# Patient Record
Sex: FEMALE | Employment: FULL TIME | URBAN - METROPOLITAN AREA
[De-identification: names, ages, dates, MRNs, and addresses within clinical notes are randomized per-mention and may not be internally consistent; named-entity substitution may affect disease eponyms.]

---

## 2020-09-11 ENCOUNTER — APPOINTMENT (OUTPATIENT)
Dept: PHYSICAL THERAPY | Age: 55
End: 2020-09-11

## 2024-07-23 ENCOUNTER — OFFICE VISIT (OUTPATIENT)
Facility: CLINIC | Age: 59
End: 2024-07-23
Payer: MEDICARE

## 2024-07-23 ENCOUNTER — HOSPITAL ENCOUNTER (OUTPATIENT)
Facility: HOSPITAL | Age: 59
Setting detail: SPECIMEN
Discharge: HOME | End: 2024-07-26
Payer: MEDICARE

## 2024-07-23 VITALS
SYSTOLIC BLOOD PRESSURE: 121 MMHG | BODY MASS INDEX: 32.2 KG/M2 | OXYGEN SATURATION: 96 % | DIASTOLIC BLOOD PRESSURE: 69 MMHG | HEART RATE: 90 BPM | HEIGHT: 62 IN | RESPIRATION RATE: 17 BRPM | TEMPERATURE: 97.8 F | WEIGHT: 175 LBS

## 2024-07-23 DIAGNOSIS — E66.09 CLASS 1 OBESITY DUE TO EXCESS CALORIES WITH SERIOUS COMORBIDITY AND BODY MASS INDEX (BMI) OF 32.0 TO 32.9 IN ADULT: ICD-10-CM

## 2024-07-23 DIAGNOSIS — I10 ESSENTIAL HYPERTENSION: ICD-10-CM

## 2024-07-23 DIAGNOSIS — F41.9 ANXIETY: ICD-10-CM

## 2024-07-23 DIAGNOSIS — Z76.89 ENCOUNTER TO ESTABLISH CARE: Primary | ICD-10-CM

## 2024-07-23 DIAGNOSIS — H54.8 LEGALLY BLIND: ICD-10-CM

## 2024-07-23 DIAGNOSIS — R76.8 POSITIVE ANA (ANTINUCLEAR ANTIBODY): ICD-10-CM

## 2024-07-23 DIAGNOSIS — A50.31: ICD-10-CM

## 2024-07-23 DIAGNOSIS — M79.7 FIBROMYALGIA: ICD-10-CM

## 2024-07-23 DIAGNOSIS — E78.2 MIXED HYPERLIPIDEMIA: ICD-10-CM

## 2024-07-23 PROBLEM — E66.811 CLASS 1 OBESITY DUE TO EXCESS CALORIES WITH SERIOUS COMORBIDITY AND BODY MASS INDEX (BMI) OF 32.0 TO 32.9 IN ADULT: Status: ACTIVE | Noted: 2024-07-23

## 2024-07-23 LAB
ALBUMIN SERPL-MCNC: 4.5 G/DL (ref 3.4–5)
ALBUMIN/GLOB SERPL: 1.7 (ref 0.8–1.7)
ALP SERPL-CCNC: 67 U/L (ref 45–117)
ALT SERPL-CCNC: 28 U/L (ref 13–56)
ANION GAP SERPL CALC-SCNC: 7 MMOL/L (ref 3–18)
AST SERPL-CCNC: 21 U/L (ref 10–38)
BILIRUB SERPL-MCNC: 0.5 MG/DL (ref 0.2–1)
BUN SERPL-MCNC: 13 MG/DL (ref 7–18)
BUN/CREAT SERPL: 17 (ref 12–20)
CALCIUM SERPL-MCNC: 10.2 MG/DL (ref 8.5–10.1)
CHLORIDE SERPL-SCNC: 97 MMOL/L (ref 100–111)
CO2 SERPL-SCNC: 33 MMOL/L (ref 21–32)
CREAT SERPL-MCNC: 0.75 MG/DL (ref 0.6–1.3)
GLOBULIN SER CALC-MCNC: 2.7 G/DL (ref 2–4)
GLUCOSE SERPL-MCNC: 95 MG/DL (ref 74–99)
POTASSIUM SERPL-SCNC: 4 MMOL/L (ref 3.5–5.5)
PROT SERPL-MCNC: 7.2 G/DL (ref 6.4–8.2)
SODIUM SERPL-SCNC: 137 MMOL/L (ref 136–145)
TSH SERPL DL<=0.05 MIU/L-ACNC: 0.96 UIU/ML (ref 0.36–3.74)

## 2024-07-23 PROCEDURE — 3078F DIAST BP <80 MM HG: CPT | Performed by: FAMILY MEDICINE

## 2024-07-23 PROCEDURE — 3074F SYST BP LT 130 MM HG: CPT | Performed by: FAMILY MEDICINE

## 2024-07-23 PROCEDURE — 99204 OFFICE O/P NEW MOD 45 MIN: CPT | Performed by: FAMILY MEDICINE

## 2024-07-23 PROCEDURE — 80053 COMPREHEN METABOLIC PANEL: CPT

## 2024-07-23 PROCEDURE — 84443 ASSAY THYROID STIM HORMONE: CPT

## 2024-07-23 PROCEDURE — 36415 COLL VENOUS BLD VENIPUNCTURE: CPT

## 2024-07-23 PROCEDURE — 86038 ANTINUCLEAR ANTIBODIES: CPT

## 2024-07-23 RX ORDER — DOXYCYCLINE HYCLATE 100 MG/1
100 CAPSULE ORAL 2 TIMES DAILY
COMMUNITY
Start: 2024-07-15

## 2024-07-23 RX ORDER — OMEPRAZOLE 40 MG/1
40 CAPSULE, DELAYED RELEASE ORAL DAILY
COMMUNITY

## 2024-07-23 RX ORDER — PROGESTERONE 100 MG/1
100 CAPSULE ORAL DAILY
COMMUNITY

## 2024-07-23 RX ORDER — HYDROCHLOROTHIAZIDE 25 MG/1
TABLET ORAL
Qty: 90 TABLET | Refills: 2 | Status: SHIPPED | OUTPATIENT
Start: 2024-07-23

## 2024-07-23 RX ORDER — DULOXETIN HYDROCHLORIDE 30 MG/1
90 CAPSULE, DELAYED RELEASE ORAL
COMMUNITY
Start: 2024-07-07

## 2024-07-23 RX ORDER — ALPRAZOLAM 0.25 MG/1
0.25 TABLET ORAL
COMMUNITY
Start: 2024-05-16

## 2024-07-23 RX ORDER — ESTRADIOL 0.1 MG/D
1 FILM, EXTENDED RELEASE TRANSDERMAL
COMMUNITY

## 2024-07-23 RX ORDER — HYDROCHLOROTHIAZIDE 25 MG/1
TABLET ORAL
COMMUNITY
End: 2024-07-23 | Stop reason: SDUPTHER

## 2024-07-23 RX ORDER — DULOXETIN HYDROCHLORIDE 60 MG/1
60 CAPSULE, DELAYED RELEASE ORAL DAILY
COMMUNITY
Start: 2024-07-07

## 2024-07-23 RX ORDER — POTASSIUM CHLORIDE 750 MG/1
10 TABLET, EXTENDED RELEASE ORAL DAILY
COMMUNITY
End: 2025-02-01

## 2024-07-23 ASSESSMENT — ENCOUNTER SYMPTOMS
DIARRHEA: 0
SHORTNESS OF BREATH: 0
NAUSEA: 0
COUGH: 0
VOMITING: 0
RHINORRHEA: 0

## 2024-07-23 NOTE — PATIENT INSTRUCTIONS
Schedule your eye exam :)  Pleasure meeting you!      FYI: You may receive a patient survey; the provider rating is based on your encounter with me specifically and NOT the staff/facility. Looking forward to your comments.          Patient Information     [facility] is dedicated to improving your health. We are excited to have you as a patient. To provide the best care, we need a good plan. To optimize your safety and care, we ask you to follow and be aware of some important policies and procedures.    A. Arrive 20 minutes prior to your appointment. Arriving prior to your scheduled medical visit allows time to complete check- in paperwork prior to seeing the physician.    B. Not showing-up for an appointment without letting your provider know leaves the practice in a difficult position and prevents other patients from being able to use the appointment slot. We are always trying to provide better access to our patients and this will help us in that goal.    C. If you are unable to keep an appointment, please call 24 hours before your appointment if at all possible. Another patient needing care might be served in the event you cannot make your appointment.    D. Bring all of your medication bottles (except those that must be refrigerated) and supplements with you to your appointment. This enables the provider to accurately assess medication needs and make important changes as needed.    E. Please seek to get your medications refilled during your scheduled appointments. This will decrease wait time for refills to be processed. When you bring all medications with you the day of your appointment, we will prescribe enough medications to last until your next appointment. Prescriptions cannot be filled after office hours, on weekends/holidays, or any other time the office is closed.    F. If you have any forms to be completed, please mention that when making your appointment. Please send the forms to the

## 2024-07-23 NOTE — PROGRESS NOTES
Depression Screen  07/23/2025    Breast cancer screen  05/20/2026    Hepatitis A vaccine  Aged Out    Hepatitis B vaccine  Aged Out    Hib vaccine  Aged Out    Polio vaccine  Aged Out    Meningococcal (ACWY) vaccine  Aged Out    Pneumococcal 0-64 years Vaccine  Aged Out        Subjective   60 y/o female here for establishing care    [facility]  Seen 7/15 for cough/congestion; treated for sinusitis with doxy; states providers she was seeing for >20 years left the practice;     Followed by Ortho for joint pain; s/p knee replacement    Last pap 2023, appt scheduled 8/2 with GYN; also on horrmone replacement therapy with GYN    Lipid , , HDL 74 in Sept 2023; had lipid testing updated 2 months ago for life insurance 6/6/2024 , HDL 86, 227 tC; fasting sugar 86    Positive TREVER in 2022; diagnosed with fibromyalgia; states seen by Rheumatology, advised to return if she had malar rash; states she has muscle spasms in her feet/back pain that would ache; states on Duloxetine 90 mg daily and this helps;     States she took care of her parents until they passed; states mother passed in her arms; states she has underlying anxiety related to thinking if she could have those medical conditions; uses xanax once/month as needed for anxiety attacks; not seeing therapist; PDMP 6/17 filled 30 pills; last fill 4/28/2024 for 10 pills    Labs obtained prior to visit? No  Reviewed with patient? N/A      ROS:     Review of Systems   Constitutional:  Negative for chills, diaphoresis, fatigue and fever.   HENT:  Positive for congestion. Negative for rhinorrhea.    Respiratory:  Negative for cough and shortness of breath.    Cardiovascular:  Negative for chest pain.   Gastrointestinal:  Negative for diarrhea, nausea and vomiting.   Genitourinary:  Negative for dysuria.   Skin:  Negative for rash.   Neurological:  Negative for light-headedness.   Psychiatric/Behavioral:  Negative for

## 2024-07-26 LAB
ANA TITR SER IF: NORMAL {TITER}
LABORATORY COMMENT REPORT: NORMAL

## 2024-07-29 LAB
ANA SPECKLED TITR SER: ABNORMAL {TITER}
ANA TITR SER IF: POSITIVE
CENTROMERE B AB SER-ACNC: <0.2 AI (ref 0–0.9)
CHROMATIN AB SERPL-ACNC: <0.2 AI (ref 0–0.9)
DSDNA AB SER-ACNC: 16 IU/ML (ref 0–9)
ENA JO1 AB SER-ACNC: <0.2 AI (ref 0–0.9)
ENA RNP AB SER-ACNC: <0.2 AI (ref 0–0.9)
ENA SCL70 AB SER-ACNC: <0.2 AI (ref 0–0.9)
ENA SM AB SER-ACNC: <0.2 AI (ref 0–0.9)
ENA SS-A AB SER-ACNC: <0.2 AI (ref 0–0.9)
ENA SS-B AB SER-ACNC: <0.2 AI (ref 0–0.9)
LABORATORY COMMENT REPORT: ABNORMAL
Lab: ABNORMAL
NOTE: ABNORMAL

## 2024-08-05 ENCOUNTER — OFFICE VISIT (OUTPATIENT)
Facility: CLINIC | Age: 59
End: 2024-08-05
Payer: MEDICARE

## 2024-08-05 VITALS
WEIGHT: 171 LBS | OXYGEN SATURATION: 97 % | TEMPERATURE: 98.4 F | SYSTOLIC BLOOD PRESSURE: 126 MMHG | RESPIRATION RATE: 16 BRPM | HEIGHT: 62 IN | HEART RATE: 89 BPM | DIASTOLIC BLOOD PRESSURE: 79 MMHG | BODY MASS INDEX: 31.47 KG/M2

## 2024-08-05 DIAGNOSIS — E83.52 HYPERCALCEMIA: ICD-10-CM

## 2024-08-05 DIAGNOSIS — R53.83 OTHER FATIGUE: ICD-10-CM

## 2024-08-05 DIAGNOSIS — Z13.71 SCREENING FOR GENETIC DISEASE CARRIER STATUS: ICD-10-CM

## 2024-08-05 DIAGNOSIS — K14.8 TONGUE LESION: ICD-10-CM

## 2024-08-05 DIAGNOSIS — M25.649 STIFFNESS OF HAND JOINT, UNSPECIFIED LATERALITY: Primary | ICD-10-CM

## 2024-08-05 DIAGNOSIS — R76.8 POSITIVE ANA (ANTINUCLEAR ANTIBODY): ICD-10-CM

## 2024-08-05 DIAGNOSIS — E87.29 CO2 RETENTION: ICD-10-CM

## 2024-08-05 DIAGNOSIS — E66.09 CLASS 1 OBESITY DUE TO EXCESS CALORIES WITH BODY MASS INDEX (BMI) OF 31.0 TO 31.9 IN ADULT, UNSPECIFIED WHETHER SERIOUS COMORBIDITY PRESENT: ICD-10-CM

## 2024-08-05 DIAGNOSIS — M79.7 FIBROMYALGIA: ICD-10-CM

## 2024-08-05 PROCEDURE — 3074F SYST BP LT 130 MM HG: CPT | Performed by: FAMILY MEDICINE

## 2024-08-05 PROCEDURE — 99214 OFFICE O/P EST MOD 30 MIN: CPT | Performed by: FAMILY MEDICINE

## 2024-08-05 PROCEDURE — 3078F DIAST BP <80 MM HG: CPT | Performed by: FAMILY MEDICINE

## 2024-08-05 ASSESSMENT — PATIENT HEALTH QUESTIONNAIRE - PHQ9
SUM OF ALL RESPONSES TO PHQ QUESTIONS 1-9: 0
SUM OF ALL RESPONSES TO PHQ QUESTIONS 1-9: 0
SUM OF ALL RESPONSES TO PHQ9 QUESTIONS 1 & 2: 0
1. LITTLE INTEREST OR PLEASURE IN DOING THINGS: NOT AT ALL
SUM OF ALL RESPONSES TO PHQ QUESTIONS 1-9: 0
2. FEELING DOWN, DEPRESSED OR HOPELESS: NOT AT ALL
SUM OF ALL RESPONSES TO PHQ QUESTIONS 1-9: 0

## 2024-08-05 ASSESSMENT — ENCOUNTER SYMPTOMS
VOMITING: 0
RHINORRHEA: 1
COUGH: 0
SHORTNESS OF BREATH: 0
NAUSEA: 0
DIARRHEA: 0

## 2024-08-05 NOTE — PROGRESS NOTES
Joseph Roa is a 59 y.o. female (: 1965) presenting to address:    Chief Complaint   Patient presents with    Follow-up     Patient states that she is having some pain ans stiffness in her hand.       Vitals:    24 1003   BP: 126/79   Pulse: 89   Resp: 16   Temp: 98.4 °F (36.9 °C)   SpO2: 97%       Coordination of Care Questionaire:   1. \"Have you been to the ER, urgent care clinic since your last visit?  Hospitalized since your last visit?\" No     2. \"Have you seen or consulted any other health care providers outside of the [facility] since your last visit?\" No      3. For patients aged 45-75: Has the patient had a colonoscopy / FIT/ Cologuard? No       If the patient is female:    4. For patients aged 40-74: Has the patient had a mammogram within the past 2 years? No       5. For patients aged 21-65: Has the patient had a pap smear? No     Advanced Directive:   1. Do you have an Advanced Directive? No     2. Would you like information on Advanced Directives? No   
(CYMBALTA) 30 MG extended release capsule 3 capsules      DULoxetine (CYMBALTA) 60 MG extended release capsule Take 1 capsule by mouth daily      estradiol (VIVELLE) 0.1 MG/24HR Place 1 patch onto the skin      omeprazole (PRILOSEC) 40 MG delayed release capsule Take 1 capsule by mouth daily      potassium chloride (KLOR-CON M) 10 MEQ extended release tablet Take 1 tablet by mouth daily      progesterone (PROMETRIUM) 100 MG CAPS capsule Take 1 capsule by mouth daily      ALPRAZolam (XANAX) 0.25 MG tablet Take 1 tablet by mouth. Daily prn anxiety attack      hydroCHLOROthiazide (HYDRODIURIL) 25 MG tablet Take one tab po daily 90 tablet 2     No current facility-administered medications for this visit.         No Known Allergies    Objective:  /79 (Site: Right Upper Arm, Position: Sitting, Cuff Size: Large Adult)   Pulse 89   Temp 98.4 °F (36.9 °C) (Temporal)   Resp 16   Ht 1.575 m (5' 2\")   Wt 77.6 kg (171 lb)   SpO2 97%   BMI 31.28 kg/m²  Body mass index is 31.28 kg/m².    Physical assessment  Physical Exam  Vitals reviewed.   Constitutional:       General: She is not in acute distress.     Appearance: Normal appearance. She is normal weight. She is not ill-appearing, toxic-appearing or diaphoretic.   HENT:      Head: Normocephalic and atraumatic.      Right Ear: External ear normal.      Left Ear: External ear normal.      Mouth/Throat:      Mouth: Mucous membranes are moist.      Pharynx: Oropharynx is clear. No oropharyngeal exudate or posterior oropharyngeal erythema.      Comments: No oral lesions on tongue  Eyes:      Extraocular Movements: Extraocular movements intact.      Conjunctiva/sclera: Conjunctivae normal.   Cardiovascular:      Rate and Rhythm: Normal rate and regular rhythm.      Heart sounds: Normal heart sounds. No murmur heard.     No friction rub. No gallop.   Pulmonary:      Effort: Pulmonary effort is normal. No respiratory distress.      Breath sounds: Normal breath sounds. No

## 2024-08-08 ENCOUNTER — PATIENT MESSAGE (OUTPATIENT)
Facility: CLINIC | Age: 59
End: 2024-08-08

## 2024-08-08 DIAGNOSIS — R53.83 OTHER FATIGUE: ICD-10-CM

## 2024-08-08 DIAGNOSIS — M25.649 STIFFNESS OF HAND JOINT, UNSPECIFIED LATERALITY: ICD-10-CM

## 2024-08-08 DIAGNOSIS — M79.7 FIBROMYALGIA: Primary | ICD-10-CM

## 2024-08-08 DIAGNOSIS — R76.8 POSITIVE ANA (ANTINUCLEAR ANTIBODY): ICD-10-CM

## 2024-08-08 NOTE — TELEPHONE ENCOUNTER
Ashlie Fitzpatrick LPN 8/8/2024 12:41 PM EDT      ----- Message -----  From: Joseph Roa \"nasra\"  Sent: 8/8/2024 11:02 AM EDT  To: [facility] Clinical Staff  Subject: Referral     Good Morning Dr Lawson. I will need a referral to a new Rhematologist. Dr MURPHY is only accepting patients he has been seeing on the regular. I havent seen him in about a year and half.

## 2024-10-01 ENCOUNTER — OFFICE VISIT (OUTPATIENT)
Facility: CLINIC | Age: 59
End: 2024-10-01
Payer: MEDICARE

## 2024-10-01 VITALS
RESPIRATION RATE: 16 BRPM | OXYGEN SATURATION: 98 % | TEMPERATURE: 98 F | HEIGHT: 62 IN | SYSTOLIC BLOOD PRESSURE: 128 MMHG | WEIGHT: 166 LBS | BODY MASS INDEX: 30.55 KG/M2 | HEART RATE: 89 BPM | DIASTOLIC BLOOD PRESSURE: 87 MMHG

## 2024-10-01 DIAGNOSIS — E83.52 HYPERCALCEMIA: ICD-10-CM

## 2024-10-01 DIAGNOSIS — Z12.11 SCREEN FOR COLON CANCER: ICD-10-CM

## 2024-10-01 DIAGNOSIS — E87.29 CO2 RETENTION: ICD-10-CM

## 2024-10-01 DIAGNOSIS — Z63.4 BEREAVEMENT: ICD-10-CM

## 2024-10-01 DIAGNOSIS — I10 ESSENTIAL HYPERTENSION: ICD-10-CM

## 2024-10-01 DIAGNOSIS — M79.7 FIBROMYALGIA: Primary | ICD-10-CM

## 2024-10-01 DIAGNOSIS — M25.50 POLYARTHRALGIA: ICD-10-CM

## 2024-10-01 DIAGNOSIS — R76.8 POSITIVE ANA (ANTINUCLEAR ANTIBODY): ICD-10-CM

## 2024-10-01 DIAGNOSIS — F41.9 ANXIETY: ICD-10-CM

## 2024-10-01 PROCEDURE — 3079F DIAST BP 80-89 MM HG: CPT | Performed by: FAMILY MEDICINE

## 2024-10-01 PROCEDURE — 3074F SYST BP LT 130 MM HG: CPT | Performed by: FAMILY MEDICINE

## 2024-10-01 PROCEDURE — 99214 OFFICE O/P EST MOD 30 MIN: CPT | Performed by: FAMILY MEDICINE

## 2024-10-01 RX ORDER — ALPRAZOLAM 0.25 MG
0.25 TABLET ORAL DAILY PRN
Qty: 15 TABLET | Refills: 0 | Status: SHIPPED | OUTPATIENT
Start: 2024-10-01 | End: 2024-10-16

## 2024-10-01 RX ORDER — ALPRAZOLAM 0.25 MG
0.25 TABLET ORAL
Status: CANCELLED | OUTPATIENT
Start: 2024-10-01

## 2024-10-01 RX ORDER — DULOXETIN HYDROCHLORIDE 30 MG/1
90 CAPSULE, DELAYED RELEASE ORAL
Qty: 30 CAPSULE | Status: CANCELLED | OUTPATIENT
Start: 2024-10-01

## 2024-10-01 RX ORDER — DULOXETIN HYDROCHLORIDE 30 MG/1
90 CAPSULE, DELAYED RELEASE ORAL DAILY
Qty: 90 CAPSULE | Refills: 5 | Status: SHIPPED | OUTPATIENT
Start: 2024-10-01

## 2024-10-01 RX ORDER — HYDROCHLOROTHIAZIDE 25 MG/1
TABLET ORAL
Qty: 90 TABLET | Refills: 2 | Status: CANCELLED | OUTPATIENT
Start: 2024-10-01

## 2024-10-01 RX ORDER — DULOXETIN HYDROCHLORIDE 60 MG/1
60 CAPSULE, DELAYED RELEASE ORAL DAILY
Qty: 30 CAPSULE | Status: CANCELLED | OUTPATIENT
Start: 2024-10-01

## 2024-10-01 SDOH — ECONOMIC STABILITY: FOOD INSECURITY: WITHIN THE PAST 12 MONTHS, THE FOOD YOU BOUGHT JUST DIDN'T LAST AND YOU DIDN'T HAVE MONEY TO GET MORE.: NEVER TRUE

## 2024-10-01 SDOH — ECONOMIC STABILITY: FOOD INSECURITY: WITHIN THE PAST 12 MONTHS, YOU WORRIED THAT YOUR FOOD WOULD RUN OUT BEFORE YOU GOT MONEY TO BUY MORE.: NEVER TRUE

## 2024-10-01 SDOH — SOCIAL STABILITY - SOCIAL INSECURITY: DISSAPEARANCE AND DEATH OF FAMILY MEMBER: Z63.4

## 2024-10-01 SDOH — ECONOMIC STABILITY: INCOME INSECURITY: HOW HARD IS IT FOR YOU TO PAY FOR THE VERY BASICS LIKE FOOD, HOUSING, MEDICAL CARE, AND HEATING?: NOT HARD AT ALL

## 2024-10-01 ASSESSMENT — ENCOUNTER SYMPTOMS
NAUSEA: 0
SHORTNESS OF BREATH: 0
RHINORRHEA: 0
VOMITING: 0
COUGH: 0
DIARRHEA: 0

## 2024-10-01 ASSESSMENT — PATIENT HEALTH QUESTIONNAIRE - PHQ9
1. LITTLE INTEREST OR PLEASURE IN DOING THINGS: SEVERAL DAYS
SUM OF ALL RESPONSES TO PHQ QUESTIONS 1-9: 2
SUM OF ALL RESPONSES TO PHQ QUESTIONS 1-9: 0
1. LITTLE INTEREST OR PLEASURE IN DOING THINGS: NOT AT ALL
SUM OF ALL RESPONSES TO PHQ QUESTIONS 1-9: 0
2. FEELING DOWN, DEPRESSED OR HOPELESS: SEVERAL DAYS
SUM OF ALL RESPONSES TO PHQ QUESTIONS 1-9: 2
SUM OF ALL RESPONSES TO PHQ9 QUESTIONS 1 & 2: 2
SUM OF ALL RESPONSES TO PHQ QUESTIONS 1-9: 0
SUM OF ALL RESPONSES TO PHQ9 QUESTIONS 1 & 2: 0
2. FEELING DOWN, DEPRESSED OR HOPELESS: NOT AT ALL
SUM OF ALL RESPONSES TO PHQ QUESTIONS 1-9: 0

## 2024-10-01 NOTE — PROGRESS NOTES
Joseph Roa is a 59 y.o. female (: 1965) presenting to address:    Chief Complaint   Patient presents with    Discuss Labs       Vitals:    10/01/24 1533   BP: 128/87   Pulse: 89   Resp: 16   Temp: 98 °F (36.7 °C)   SpO2: 98%       Coordination of Care Questionaire:   1. \"Have you been to the ER, urgent care clinic since your last visit?  Hospitalized since your last visit?\" No     2. \"Have you seen or consulted any other health care providers outside of the [facility] since your last visit?\" No     3. For patients aged 45-75: Has the patient had a colonoscopy / FIT/ Cologuard? No       If the patient is female:    4. For patients aged 40-74: Has the patient had a mammogram within the past 2 years? No       5. For patients aged 21-65: Has the patient had a pap smear? No     Advanced Directive:   1. Do you have an Advanced Directive? No     2. Would you like information on Advanced Directives? No

## 2024-10-01 NOTE — PATIENT INSTRUCTIONS
[facility]  [address] [address]  Ph: [phone] or  [phone]  Fax: [phone]         Start glucosamine-chondroitin for joint stiffness

## 2024-10-01 NOTE — PROGRESS NOTES
[address] [place] [address]               [phone]        Joseph Roa is a 59 y.o. female and presents with Discuss Labs           Assessment/Plan:  Joseph \"nasra\" was seen today for discuss labs.    Diagnoses and all orders for this visit:    Fibromyalgia  -     DULoxetine (CYMBALTA) 30 MG extended release capsule; Take 3 capsules by mouth daily    CO2 retention    Hypercalcemia    Positive TREVER (antinuclear antibody)    Essential hypertension    Anxiety  -     ALPRAZolam (XANAX) 0.25 MG tablet; Take 1 tablet by mouth daily as needed for Anxiety for up to 15 days. Daily prn anxiety attack Max Daily Amount: 0.25 mg    Screen for colon cancer  -     External Referral To Gastroenterology    Polyarthralgia    Bereavement      Update labwork to reassess CO2/calcium  Fibro: needs refill on cymbalta 90 mg she has been taking adjust medication to 1 dosage and continue 90 mg daily  Positive TREVER/arthralgia: begin glucosamine-chondroitin; advised to use tylenol 1000 mg po bid prn; advised to schedule appt with rheumatology  Due for colonoscopy; advised to schedule; she will contact her Gastro provider in [place] to arrange  HTN: controlled; continue current meds  Anxiety: meds refilled  Gave her our support while she is grieving; denies depressed mood    Follow up and disposition:   Return in about 4 weeks (around 10/29/2024), or if symptoms worsen or fail to improve, for review lab results, physical 30 min.      Health Maintenance:   Health Maintenance   Topic Date Due    HIV screen  Never done    Hepatitis C screen  Never done    Cervical cancer screen  Never done    Lipids  Never done    Colorectal Cancer Screen  Never done    Shingles vaccine (1 of 2) Never done    Respiratory Syncytial Virus (RSV) Pregnant or age 60 yrs+ (1 - 1-dose 60+ series) Never done    Annual Wellness Visit (Medicare Advantage)  Never done    Flu vaccine (1) Never done    COVID-19 Vaccine (1 - 2023-24

## 2024-10-15 LAB
A/G RATIO: 1.6 RATIO (ref 1.1–2.6)
ALBUMIN: 4.4 G/DL (ref 3.5–5)
ALP BLD-CCNC: 58 U/L (ref 40–120)
ALT SERPL-CCNC: 23 U/L (ref 5–40)
ANION GAP SERPL CALCULATED.3IONS-SCNC: 10 MMOL/L (ref 3–15)
AST SERPL-CCNC: 23 U/L (ref 10–37)
BILIRUB SERPL-MCNC: 0.3 MG/DL (ref 0.2–1.2)
BUN BLDV-MCNC: 23 MG/DL (ref 6–22)
CALCIUM IONIZED: 4.4 MG/DL (ref 4.4–5.4)
CALCIUM SERPL-MCNC: 9.7 MG/DL (ref 8.4–10.5)
CHLORIDE BLD-SCNC: 98 MMOL/L (ref 98–110)
CO2: 31 MMOL/L (ref 20–32)
CREAT SERPL-MCNC: 0.6 MG/DL (ref 0.8–1.4)
GFR, ESTIMATED: >60 ML/MIN/1.73 SQ.M.
GLOBULIN: 2.7 G/DL (ref 2–4)
GLUCOSE: 107 MG/DL (ref 70–99)
HSV 1 BY PCR: NEGATIVE
Lab: NEGATIVE
POTASSIUM SERPL-SCNC: 3.6 MMOL/L (ref 3.5–5.5)
SODIUM BLD-SCNC: 139 MMOL/L (ref 133–145)
TOTAL PROTEIN: 7.1 G/DL (ref 6.2–8.1)

## 2024-10-26 SDOH — HEALTH STABILITY: PHYSICAL HEALTH: ON AVERAGE, HOW MANY MINUTES DO YOU ENGAGE IN EXERCISE AT THIS LEVEL?: 50 MIN

## 2024-10-26 SDOH — HEALTH STABILITY: PHYSICAL HEALTH: ON AVERAGE, HOW MANY DAYS PER WEEK DO YOU ENGAGE IN MODERATE TO STRENUOUS EXERCISE (LIKE A BRISK WALK)?: 5 DAYS

## 2024-10-26 ASSESSMENT — PATIENT HEALTH QUESTIONNAIRE - PHQ9
SUM OF ALL RESPONSES TO PHQ QUESTIONS 1-9: 0
1. LITTLE INTEREST OR PLEASURE IN DOING THINGS: NOT AT ALL
SUM OF ALL RESPONSES TO PHQ QUESTIONS 1-9: 0
2. FEELING DOWN, DEPRESSED OR HOPELESS: NOT AT ALL
SUM OF ALL RESPONSES TO PHQ QUESTIONS 1-9: 0
SUM OF ALL RESPONSES TO PHQ9 QUESTIONS 1 & 2: 0
SUM OF ALL RESPONSES TO PHQ QUESTIONS 1-9: 0

## 2024-10-26 ASSESSMENT — LIFESTYLE VARIABLES
HOW MANY STANDARD DRINKS CONTAINING ALCOHOL DO YOU HAVE ON A TYPICAL DAY: PATIENT DOES NOT DRINK
HOW OFTEN DO YOU HAVE A DRINK CONTAINING ALCOHOL: NEVER
HOW MANY STANDARD DRINKS CONTAINING ALCOHOL DO YOU HAVE ON A TYPICAL DAY: 0
HOW OFTEN DO YOU HAVE A DRINK CONTAINING ALCOHOL: 1
HOW OFTEN DO YOU HAVE SIX OR MORE DRINKS ON ONE OCCASION: 1

## 2024-11-05 ENCOUNTER — OFFICE VISIT (OUTPATIENT)
Facility: CLINIC | Age: 59
End: 2024-11-05

## 2024-11-05 VITALS
OXYGEN SATURATION: 98 % | RESPIRATION RATE: 16 BRPM | BODY MASS INDEX: 30.18 KG/M2 | WEIGHT: 164 LBS | SYSTOLIC BLOOD PRESSURE: 121 MMHG | HEIGHT: 62 IN | TEMPERATURE: 97 F | DIASTOLIC BLOOD PRESSURE: 80 MMHG | HEART RATE: 93 BPM

## 2024-11-05 DIAGNOSIS — M25.50 POLYARTHRALGIA: ICD-10-CM

## 2024-11-05 DIAGNOSIS — R76.8 POSITIVE ANA (ANTINUCLEAR ANTIBODY): ICD-10-CM

## 2024-11-05 DIAGNOSIS — R53.82 CHRONIC FATIGUE: ICD-10-CM

## 2024-11-05 DIAGNOSIS — E61.1 IRON DEFICIENCY: ICD-10-CM

## 2024-11-05 DIAGNOSIS — M79.7 FIBROMYALGIA: ICD-10-CM

## 2024-11-05 DIAGNOSIS — Z00.00 MEDICARE ANNUAL WELLNESS VISIT, SUBSEQUENT: Primary | ICD-10-CM

## 2024-11-05 DIAGNOSIS — E53.8 B12 DEFICIENCY: ICD-10-CM

## 2024-11-05 DIAGNOSIS — K21.9 GASTROESOPHAGEAL REFLUX DISEASE, UNSPECIFIED WHETHER ESOPHAGITIS PRESENT: ICD-10-CM

## 2024-11-05 DIAGNOSIS — E78.2 MIXED HYPERLIPIDEMIA: ICD-10-CM

## 2024-11-05 DIAGNOSIS — Z63.4 BEREAVEMENT: ICD-10-CM

## 2024-11-05 DIAGNOSIS — E55.9 VITAMIN D DEFICIENCY: ICD-10-CM

## 2024-11-05 DIAGNOSIS — Z13.1 SCREENING FOR DIABETES MELLITUS: ICD-10-CM

## 2024-11-05 RX ORDER — OMEPRAZOLE 40 MG/1
40 CAPSULE, DELAYED RELEASE ORAL DAILY
Qty: 90 CAPSULE | Refills: 2 | Status: SHIPPED | OUTPATIENT
Start: 2024-11-05

## 2024-11-05 SDOH — SOCIAL STABILITY - SOCIAL INSECURITY: DISSAPEARANCE AND DEATH OF FAMILY MEMBER: Z63.4

## 2024-11-05 ASSESSMENT — ENCOUNTER SYMPTOMS: SHORTNESS OF BREATH: 0

## 2024-11-05 NOTE — PATIENT INSTRUCTIONS
disease.  Your doctor has found that you have a chance of having heart disease. A heart-healthy lifestyle can help keep your heart healthy and prevent heart disease. This lifestyle includes eating healthy, being active, staying at a weight that's healthy for you, and not smoking or using tobacco. It also includes taking medicines as directed, managing other health conditions, and trying to get a healthy amount of sleep.  Follow-up care is a key part of your treatment and safety. Be sure to make and go to all appointments, and call your doctor if you are having problems. It's also a good idea to know your test results and keep a list of the medicines you take.  How can you care for yourself at home?  Diet    Use less salt when you cook and eat. This helps lower your blood pressure. Taste food before salting. Add only a little salt when you think you need it. With time, your taste buds will adjust to less salt.     Eat fewer snack items, fast foods, canned soups, and other high-salt, high-fat, processed foods.     Read food labels and try to avoid saturated and trans fats. They increase your risk of heart disease by raising cholesterol levels.     Limit the amount of solid fat--butter, margarine, and shortening--you eat. Use olive, peanut, or canola oil when you cook. Bake, broil, and steam foods instead of frying them.     Eat a variety of fruit and vegetables every day. Dark green, deep orange, red, or yellow fruits and vegetables are especially good for you. Examples include spinach, carrots, peaches, and berries.     Foods high in fiber can reduce your cholesterol and provide important vitamins and minerals. High-fiber foods include whole-grain cereals and breads, oatmeal, beans, brown rice, citrus fruits, and apples.     Eat lean proteins. Heart-healthy proteins include seafood, lean meats and poultry, eggs, beans, peas, nuts, seeds, and soy products.     Limit drinks and foods with added sugar. These include

## 2024-11-05 NOTE — PROGRESS NOTES
Joseph Roa is a 59 y.o. female (: 1965) presenting to address:    Chief Complaint   Patient presents with    Medicare AWV       Vitals:    24 1510   BP: 121/80   Pulse: 93   Resp: 16   Temp: 97 °F (36.1 °C)   SpO2: 98%       Coordination of Care Questionaire:     \"Have you been to the ER, urgent care clinic since your last visit?  Hospitalized since your last visit?\"    No     “Have you seen or consulted any other health care providers outside our system since your last visit?”    No      “Have you had a pap smear?”    No           “Have you had a colorectal cancer screening such as a colonoscopy/FIT/Cologuard?    No     
Medicare Annual Wellness Visit Express Report     Sexual Activity    Sexually active; Partners: Male.   Comments:      Sexual Activity Last Reviewed    Sexual Activity    Reviewed By Date/Time   Melva Blood MA 11/5/2024  3:20 PM     Fall Risk Screening Results    Flowsheet [facility] Office Visit from 11/5/2024 in [facility]   Fall Risk Assessment    Do you feel unsteady or are you worried about falling? no    2 or more falls in past year? no    Fall with injury in past year? no    Timed Up and Go Test > 12 seconds? --     Cognitive Screening Results    Flowsheet [facility] Office Visit from 11/5/2024 in [facility]   Cognitive Screening: Mini-Cog    Cognitive Unable to Assess --   Clock Drawing Test (CDT) Normal   Words recalled 3 Words Recalled   Total Score 5   Total Score Interpretation Normal Mini-Cog     Depression Screening Results    [facility] Office Visit from 11/5/2024 in [facility] Office Visit from 10/1/2024 in [facility]   PHQ-2 Over the past 2 weeks, how often have you been bothered by any of the following problems?     Little interest or pleasure in doing things Not at all  Not at all   Feeling down, depressed, or hopeless Not at all  Not at all   PHQ-2 Score 0  0   PHQ-9 Over the past 2 weeks, how often have you been bothered by any of the following problems?     PHQ-9 Total Score 0  0   PHQ-9 Total Score 0  0   AMB C-SSRS Suicide Screening       Alcohol Screening Results    [facility] Office Visit from 11/5/2024 in [facility]   Q1: How often do you have a drink containing alcohol? Never    Q2: How many drinks containing alcohol do you have on a typical day when you are drinking? Patient does not drink    Q3: How often do you have six or more drinks on one occasion? Never    AUDIT-C Score -1      DAST-10 Screening Results    Flowsheet [facility] Office Visit from 11/5/2024 in [facility]   How many times in the 
Ear: Tympanic membrane, ear canal and external ear normal. There is no impacted cerumen.      Ears:      Comments: Hearing intact to finger rub b/l     Nose: Nose normal.      Mouth/Throat:      Mouth: Mucous membranes are moist.      Pharynx: No oropharyngeal exudate or posterior oropharyngeal erythema.   Eyes:      Extraocular Movements: Extraocular movements intact.      Conjunctiva/sclera: Conjunctivae normal.      Pupils: Pupils are equal, round, and reactive to light.   Cardiovascular:      Rate and Rhythm: Normal rate and regular rhythm.      Heart sounds: Normal heart sounds. No murmur heard.     No friction rub. No gallop.   Pulmonary:      Effort: Pulmonary effort is normal. No respiratory distress.      Breath sounds: Normal breath sounds. No stridor. No wheezing, rhonchi or rales.   Abdominal:      General: Bowel sounds are normal. There is no distension.      Palpations: Abdomen is soft. There is no mass.      Tenderness: There is no abdominal tenderness. There is no guarding or rebound.      Hernia: No hernia is present.   Musculoskeletal:         General: No tenderness.      Cervical back: Normal range of motion.      Right lower leg: No edema.      Left lower leg: No edema.      Comments: Nontender throughout arms/legs   Skin:     General: Skin is warm.      Findings: No bruising, erythema, lesion or rash.   Neurological:      General: No focal deficit present.      Mental Status: She is alert and oriented to person, place, and time. Mental status is at baseline.      Cranial Nerves: No cranial nerve deficit.      Sensory: No sensory deficit.      Motor: No weakness.      Gait: Gait normal.   Psychiatric:         Mood and Affect: Mood normal.         Behavior: Behavior normal.         Thought Content: Thought content normal.         Judgment: Judgment normal.                 I have discussed the diagnosis with the patient and the intended plan as seen in the above orders.  The patient has received an

## 2025-01-10 DIAGNOSIS — I10 ESSENTIAL HYPERTENSION: ICD-10-CM

## 2025-01-10 RX ORDER — HYDROCHLOROTHIAZIDE 25 MG/1
TABLET ORAL
Qty: 90 TABLET | Refills: 1 | Status: SHIPPED | OUTPATIENT
Start: 2025-01-10

## 2025-01-16 ENCOUNTER — COMMUNITY OUTREACH (OUTPATIENT)
Facility: CLINIC | Age: 60
End: 2025-01-16

## 2025-01-27 LAB
BASOPHILS # BLD: 1 % (ref 0–2)
BASOPHILS ABSOLUTE: 0.1 K/UL (ref 0–0.2)
CHOLESTEROL, TOTAL: 197 MG/DL (ref 110–200)
CHOLESTEROL/HDL RATIO: 2.6 (ref 0–5)
EOSINOPHIL # BLD: 3 % (ref 0–6)
EOSINOPHILS ABSOLUTE: 0.2 K/UL (ref 0–0.5)
ESTIMATED AVERAGE GLUCOSE: 100 MG/DL (ref 91–123)
FERRITIN: 59 NG/ML (ref 10–291)
HBA1C MFR BLD: 5.1 % (ref 4.8–5.6)
HCT VFR BLD CALC: 44.4 % (ref 35.1–48)
HDLC SERPL-MCNC: 77 MG/DL
HEMOGLOBIN: 15.4 G/DL (ref 11.7–16)
IRON % SATURATION: 24 % (ref 20–50)
IRON: 85 MCG/DL (ref 30–160)
LDL CHOLESTEROL: 106 MG/DL (ref 50–99)
LDL/HDL RATIO: 1.4
LYMPHOCYTES # BLD: 26 % (ref 20–45)
LYMPHOCYTES ABSOLUTE: 1.7 K/UL (ref 1–4.8)
MCH RBC QN AUTO: 31 PG (ref 26–34)
MCHC RBC AUTO-ENTMCNC: 35 G/DL (ref 31–36)
MCV RBC AUTO: 90 FL (ref 80–99)
MONOCYTES ABSOLUTE: 0.5 K/UL (ref 0.1–1)
MONOCYTES: 8 % (ref 3–12)
NEUTROPHILS ABSOLUTE: 4 K/UL (ref 1.8–7.7)
NEUTROPHILS SEGMENTED: 62 % (ref 40–75)
NON-HDL CHOLESTEROL: 120 MG/DL
PDW BLD-RTO: 12.2 % (ref 10–15.5)
PLATELET # BLD: 307 K/UL (ref 140–440)
PMV BLD AUTO: 9.1 FL (ref 9–13)
RBC # BLD: 4.96 M/UL (ref 3.8–5.2)
TOTAL IRON BINDING CAPACITY: 354 MCG/DL (ref 228–428)
TRIGL SERPL-MCNC: 66 MG/DL (ref 40–149)
UNSATURATED IRON BINDING CAPACITY: 269 MCG/DL (ref 110–370)
VITAMIN B-12: 641 PG/ML (ref 211–911)
VITAMIN D 25-HYDROXY: 36 NG/ML (ref 32–100)
VLDLC SERPL CALC-MCNC: 13 MG/DL (ref 8–30)
WBC # BLD: 6.5 K/UL (ref 4–11)

## 2025-02-06 ENCOUNTER — TELEMEDICINE (OUTPATIENT)
Facility: CLINIC | Age: 60
End: 2025-02-06

## 2025-02-06 DIAGNOSIS — R53.82 CHRONIC FATIGUE: ICD-10-CM

## 2025-02-06 DIAGNOSIS — E78.2 MIXED HYPERLIPIDEMIA: ICD-10-CM

## 2025-02-06 DIAGNOSIS — R76.8 POSITIVE ANA (ANTINUCLEAR ANTIBODY): ICD-10-CM

## 2025-02-06 DIAGNOSIS — R45.84 ANHEDONIA: ICD-10-CM

## 2025-02-06 DIAGNOSIS — I10 ESSENTIAL HYPERTENSION: ICD-10-CM

## 2025-02-06 DIAGNOSIS — H25.9 AGE-RELATED CATARACT OF BOTH EYES, UNSPECIFIED AGE-RELATED CATARACT TYPE: ICD-10-CM

## 2025-02-06 DIAGNOSIS — M79.7 FIBROMYALGIA: Primary | ICD-10-CM

## 2025-02-06 DIAGNOSIS — H04.123 DRY EYES: ICD-10-CM

## 2025-02-06 DIAGNOSIS — Z12.11 SCREEN FOR COLON CANCER: ICD-10-CM

## 2025-02-06 RX ORDER — EZETIMIBE 10 MG/1
10 TABLET ORAL
Qty: 90 TABLET | Refills: 2 | Status: SHIPPED | OUTPATIENT
Start: 2025-02-06

## 2025-02-06 RX ORDER — POTASSIUM CHLORIDE 750 MG/1
10 TABLET, EXTENDED RELEASE ORAL DAILY
Qty: 60 TABLET | Refills: 3 | Status: CANCELLED | OUTPATIENT
Start: 2025-02-06

## 2025-02-06 RX ORDER — BUPROPION HYDROCHLORIDE 150 MG/1
150 TABLET ORAL EVERY MORNING
Qty: 30 TABLET | Refills: 3 | Status: SHIPPED | OUTPATIENT
Start: 2025-02-06

## 2025-02-06 SDOH — ECONOMIC STABILITY: FOOD INSECURITY: WITHIN THE PAST 12 MONTHS, YOU WORRIED THAT YOUR FOOD WOULD RUN OUT BEFORE YOU GOT MONEY TO BUY MORE.: NEVER TRUE

## 2025-02-06 SDOH — ECONOMIC STABILITY: FOOD INSECURITY: WITHIN THE PAST 12 MONTHS, THE FOOD YOU BOUGHT JUST DIDN'T LAST AND YOU DIDN'T HAVE MONEY TO GET MORE.: NEVER TRUE

## 2025-02-06 ASSESSMENT — PATIENT HEALTH QUESTIONNAIRE - PHQ9
SUM OF ALL RESPONSES TO PHQ QUESTIONS 1-9: 11
SUM OF ALL RESPONSES TO PHQ9 QUESTIONS 1 & 2: 0
2. FEELING DOWN, DEPRESSED OR HOPELESS: SEVERAL DAYS
SUM OF ALL RESPONSES TO PHQ QUESTIONS 1-9: 0
SUM OF ALL RESPONSES TO PHQ QUESTIONS 1-9: 0
SUM OF ALL RESPONSES TO PHQ QUESTIONS 1-9: 11
3. TROUBLE FALLING OR STAYING ASLEEP: NOT AT ALL
1. LITTLE INTEREST OR PLEASURE IN DOING THINGS: NOT AT ALL
9. THOUGHTS THAT YOU WOULD BE BETTER OFF DEAD, OR OF HURTING YOURSELF: NOT AT ALL
6. FEELING BAD ABOUT YOURSELF - OR THAT YOU ARE A FAILURE OR HAVE LET YOURSELF OR YOUR FAMILY DOWN: SEVERAL DAYS
7. TROUBLE CONCENTRATING ON THINGS, SUCH AS READING THE NEWSPAPER OR WATCHING TELEVISION: NEARLY EVERY DAY
10. IF YOU CHECKED OFF ANY PROBLEMS, HOW DIFFICULT HAVE THESE PROBLEMS MADE IT FOR YOU TO DO YOUR WORK, TAKE CARE OF THINGS AT HOME, OR GET ALONG WITH OTHER PEOPLE: SOMEWHAT DIFFICULT
2. FEELING DOWN, DEPRESSED OR HOPELESS: NOT AT ALL
SUM OF ALL RESPONSES TO PHQ QUESTIONS 1-9: 0
SUM OF ALL RESPONSES TO PHQ9 QUESTIONS 1 & 2: 4
8. MOVING OR SPEAKING SO SLOWLY THAT OTHER PEOPLE COULD HAVE NOTICED. OR THE OPPOSITE, BEING SO FIGETY OR RESTLESS THAT YOU HAVE BEEN MOVING AROUND A LOT MORE THAN USUAL: NOT AT ALL
1. LITTLE INTEREST OR PLEASURE IN DOING THINGS: NEARLY EVERY DAY
SUM OF ALL RESPONSES TO PHQ QUESTIONS 1-9: 0
SUM OF ALL RESPONSES TO PHQ QUESTIONS 1-9: 11
SUM OF ALL RESPONSES TO PHQ QUESTIONS 1-9: 11
4. FEELING TIRED OR HAVING LITTLE ENERGY: NEARLY EVERY DAY
5. POOR APPETITE OR OVEREATING: NOT AT ALL

## 2025-02-06 NOTE — PROGRESS NOTES
Virtual Visit    Patient states no concerns.      1. \"Have you been to the ER, urgent care clinic since your last visit?  Hospitalized since your last visit?\" Patient states no    2. \"Have you seen or consulted any other health care providers outside of the [facility] since your last visit?\" Patient states no    3. For patients aged 45-75: Has the patient had a colonoscopy / FIT/ Cologuard? No      If the patient is female:    4. For patients aged 40-74: Has the patient had a mammogram within the past 2 years? Yes      5. For patients aged 21-65: Has the patient had a pap smear? {Cancer Care Gap present? Yes          No colonoscopy on file  No cologuard on file  No FIT/FOBT on file   No flexible sigmoidoscopy on file

## 2025-02-06 NOTE — PROGRESS NOTES
[address] [place] [address]               [phone]        Joseph Roa is a 59 y.o. female and presents with Follow-up           Assessment/Plan:  Joseph \"nasra\" was seen today for follow-up.    Diagnoses and all orders for this visit:    Fibromyalgia    Chronic fatigue    Positive TREVER (antinuclear antibody)    Screen for colon cancer    Dry eyes    Age-related cataract of both eyes, unspecified age-related cataract type    Mixed hyperlipidemia  -     External Referral To Nutrition Services  -     ezetimibe (ZETIA) 10 MG tablet; Take 1 tablet by mouth nightly  -     TSH; Future  -     Lipid Panel; Future  -     Comprehensive Metabolic Panel; Future    Essential hypertension  -     TSH; Future  -     Lipid Panel; Future  -     Comprehensive Metabolic Panel; Future  -     CBC with Auto Differential; Future  -     Urinalysis with Microscopic; Future    Anhedonia  -     buPROPion (WELLBUTRIN XL) 150 MG extended release tablet; Take 1 tablet by mouth every morning      Chronic fatigue/fibromyalgia: continue Cymbalta; add wellbutrin for underlying anhedonia/loss of concentration; educated on side effects; f/u in 1 month for re-evaluation    Continue workup for RA with rheumatology;     Dry eyes/cataracts added to list of her conditions    HLD LDL uncontrolled; ok with starting medication; concern for dementia; will begin non-statin due to associated memory loss with statins; refer to Nutrition to assist in dietary changes    HTN: update labs in May          Follow up and disposition:   Return in about 4 weeks (around 3/6/2025), or if symptoms worsen or fail to improve, for follow up -15 min on mood.      Health Maintenance:   Health Maintenance   Topic Date Due    HIV screen  Never done    Hepatitis C screen  Never done    Colorectal Cancer Screen  Never done    Pneumococcal 50+ years Vaccine (2 of 2 - PCV) 10/24/2016    DTaP/Tdap/Td vaccine (2 - Td or Tdap) 11/12/2024

## 2025-03-13 DIAGNOSIS — I10 ESSENTIAL HYPERTENSION: ICD-10-CM

## 2025-03-13 RX ORDER — HYDROCHLOROTHIAZIDE 25 MG/1
TABLET ORAL
Qty: 90 TABLET | Refills: 1 | Status: SHIPPED | OUTPATIENT
Start: 2025-03-13

## 2025-04-14 DIAGNOSIS — M79.7 FIBROMYALGIA: ICD-10-CM

## 2025-04-14 RX ORDER — DULOXETIN HYDROCHLORIDE 30 MG/1
90 CAPSULE, DELAYED RELEASE ORAL DAILY
Qty: 90 CAPSULE | Refills: 0 | Status: SHIPPED | OUTPATIENT
Start: 2025-04-14

## 2025-04-14 NOTE — TELEPHONE ENCOUNTER
Overdue for follow up; was due 1 month after our last visit  Please reschedule appt for within 30 days and advise her we cannot refill after that until seen

## 2025-04-14 NOTE — TELEPHONE ENCOUNTER
Requested Prescriptions     Pending Prescriptions Disp Refills    DULoxetine (CYMBALTA) 30 MG extended release capsule 90 capsule 5     Sig: Take 3 capsules by mouth daily     Last seen: 2/6/25  Next seen: 6/2/25    Last filled: 10/01/24 Qty 90 w/5 refills

## 2025-05-07 DIAGNOSIS — I10 ESSENTIAL HYPERTENSION: ICD-10-CM

## 2025-05-08 RX ORDER — HYDROCHLOROTHIAZIDE 25 MG/1
TABLET ORAL
Qty: 90 TABLET | Refills: 1 | Status: SHIPPED | OUTPATIENT
Start: 2025-05-08

## 2025-05-21 DIAGNOSIS — M79.7 FIBROMYALGIA: ICD-10-CM

## 2025-05-23 RX ORDER — DULOXETIN HYDROCHLORIDE 30 MG/1
90 CAPSULE, DELAYED RELEASE ORAL DAILY
Qty: 33 CAPSULE | Refills: 0 | Status: SHIPPED | OUTPATIENT
Start: 2025-05-23

## 2025-05-28 LAB
A/G RATIO: 1.5 RATIO (ref 1.1–2.6)
ALBUMIN: 4.4 G/DL (ref 3.5–5)
ALP BLD-CCNC: 63 U/L (ref 40–120)
ALT SERPL-CCNC: 16 U/L (ref 5–40)
ANION GAP SERPL CALCULATED.3IONS-SCNC: 15 MMOL/L (ref 3–15)
AST SERPL-CCNC: 21 U/L (ref 10–37)
BACTERIA, URINE: NEGATIVE
BASOPHILS # BLD: 1 % (ref 0–2)
BASOPHILS ABSOLUTE: 0.1 K/UL (ref 0–0.2)
BILIRUB SERPL-MCNC: 0.5 MG/DL (ref 0.2–1.2)
BILIRUBIN, URINE: NEGATIVE
BUN BLDV-MCNC: 16 MG/DL (ref 6–22)
CALCIUM SERPL-MCNC: 10 MG/DL (ref 8.4–10.5)
CHLORIDE BLD-SCNC: 97 MMOL/L (ref 98–110)
CHOLESTEROL, TOTAL: 218 MG/DL (ref 110–200)
CHOLESTEROL/HDL RATIO: 2.7 (ref 0–5)
CLARITY, UA: CLEAR
CO2: 28 MMOL/L (ref 20–32)
COLOR, UA: YELLOW
CREAT SERPL-MCNC: 0.7 MG/DL (ref 0.8–1.4)
EOSINOPHIL # BLD: 2 % (ref 0–6)
EOSINOPHILS ABSOLUTE: 0.2 K/UL (ref 0–0.5)
GFR, ESTIMATED: >90 ML/MIN/1.73 SQ.M.
GLOBULIN: 3 G/DL (ref 2–4)
GLUCOSE URINE: NEGATIVE MG/DL
GLUCOSE: 97 MG/DL (ref 70–99)
HCT VFR BLD CALC: 44.7 % (ref 35.1–48)
HDLC SERPL-MCNC: 82 MG/DL
HEMOGLOBIN: 15.3 G/DL (ref 11.7–16)
HYALINE CASTS: ABNORMAL /LPF (ref 0–2)
KETONES, URINE: NEGATIVE MG/DL
LEUKOCYTE ESTERASE, URINE: ABNORMAL
LYMPHOCYTES # BLD: 21 % (ref 20–45)
LYMPHOCYTES ABSOLUTE: 1.4 K/UL (ref 1–4.8)
MCH RBC QN AUTO: 31 PG (ref 26–34)
MCHC RBC AUTO-ENTMCNC: 34 G/DL (ref 31–36)
MCV RBC AUTO: 91 FL (ref 80–99)
MONOCYTES ABSOLUTE: 0.5 K/UL (ref 0.1–1)
MONOCYTES: 7 % (ref 3–12)
NEUTROPHILS ABSOLUTE: 4.6 K/UL (ref 1.8–7.7)
NEUTROPHILS SEGMENTED: 69 % (ref 40–75)
NITRITE, URINE: NEGATIVE
NORMACHROMIC RBC: NORMAL
NORMACYTIC RBC: NORMAL
OCCULT BLOOD,URINE: NEGATIVE
PDW BLD-RTO: 12.2 % (ref 10–15.5)
PH, URINE: 7.5 PH (ref 5–8)
PLATELET # BLD: NORMAL 10*3/UL
POTASSIUM SERPL-SCNC: 3.4 MMOL/L (ref 3.5–5.5)
PROTEIN, URINE: NEGATIVE MG/DL
RBC # BLD: 4.94 M/UL (ref 3.8–5.2)
RBC URINE: ABNORMAL /HPF
SMEAR REVIEW: NORMAL
SODIUM BLD-SCNC: 140 MMOL/L (ref 133–145)
SPECIFIC GRAVITY UA: 1.02 (ref 1–1.03)
SQUAMOUS EPITHELIAL CELLS: ABNORMAL /HPF
TOTAL PROTEIN: 7.4 G/DL (ref 6.2–8.1)
TSH SERPL DL<=0.05 MIU/L-ACNC: 1.42 MCU/ML (ref 0.27–4.2)
UROBILINOGEN, URINE: 1 MG/DL
WBC # BLD: 6.7 K/UL (ref 4–11)
WBC URINE: ABNORMAL /HPF (ref 0–5)

## 2025-06-02 ENCOUNTER — OFFICE VISIT (OUTPATIENT)
Facility: CLINIC | Age: 60
End: 2025-06-02
Payer: MEDICARE

## 2025-06-02 VITALS
OXYGEN SATURATION: 97 % | SYSTOLIC BLOOD PRESSURE: 129 MMHG | DIASTOLIC BLOOD PRESSURE: 83 MMHG | HEIGHT: 62 IN | BODY MASS INDEX: 30.36 KG/M2 | WEIGHT: 165 LBS | HEART RATE: 93 BPM | RESPIRATION RATE: 16 BRPM

## 2025-06-02 DIAGNOSIS — E87.6 HYPOKALEMIA: ICD-10-CM

## 2025-06-02 DIAGNOSIS — E66.811 CLASS 1 OBESITY DUE TO EXCESS CALORIES WITH SERIOUS COMORBIDITY AND BODY MASS INDEX (BMI) OF 30.0 TO 30.9 IN ADULT: ICD-10-CM

## 2025-06-02 DIAGNOSIS — R45.84 ANHEDONIA: ICD-10-CM

## 2025-06-02 DIAGNOSIS — K21.9 GASTROESOPHAGEAL REFLUX DISEASE, UNSPECIFIED WHETHER ESOPHAGITIS PRESENT: ICD-10-CM

## 2025-06-02 DIAGNOSIS — E78.2 MIXED HYPERLIPIDEMIA: Primary | ICD-10-CM

## 2025-06-02 DIAGNOSIS — E66.09 CLASS 1 OBESITY DUE TO EXCESS CALORIES WITH SERIOUS COMORBIDITY AND BODY MASS INDEX (BMI) OF 30.0 TO 30.9 IN ADULT: ICD-10-CM

## 2025-06-02 PROCEDURE — 99214 OFFICE O/P EST MOD 30 MIN: CPT | Performed by: FAMILY MEDICINE

## 2025-06-02 PROCEDURE — 3074F SYST BP LT 130 MM HG: CPT | Performed by: FAMILY MEDICINE

## 2025-06-02 PROCEDURE — 3079F DIAST BP 80-89 MM HG: CPT | Performed by: FAMILY MEDICINE

## 2025-06-02 RX ORDER — POTASSIUM CHLORIDE 750 MG/1
10 TABLET, EXTENDED RELEASE ORAL DAILY
Qty: 90 TABLET | Refills: 1 | Status: SHIPPED | OUTPATIENT
Start: 2025-06-02

## 2025-06-02 RX ORDER — BUPROPION HYDROCHLORIDE 150 MG/1
150 TABLET ORAL EVERY MORNING
Qty: 30 TABLET | Refills: 5 | Status: SHIPPED | OUTPATIENT
Start: 2025-06-02

## 2025-06-02 RX ORDER — OMEPRAZOLE 40 MG/1
40 CAPSULE, DELAYED RELEASE ORAL DAILY
Qty: 90 CAPSULE | Refills: 2 | Status: SHIPPED | OUTPATIENT
Start: 2025-06-02

## 2025-06-02 ASSESSMENT — PATIENT HEALTH QUESTIONNAIRE - PHQ9
SUM OF ALL RESPONSES TO PHQ QUESTIONS 1-9: 0
2. FEELING DOWN, DEPRESSED OR HOPELESS: NOT AT ALL
1. LITTLE INTEREST OR PLEASURE IN DOING THINGS: NOT AT ALL
SUM OF ALL RESPONSES TO PHQ QUESTIONS 1-9: 0

## 2025-06-02 ASSESSMENT — ENCOUNTER SYMPTOMS: SHORTNESS OF BREATH: 0

## 2025-06-02 NOTE — PROGRESS NOTES
[address] [place] [address]               [phone]        Joseph Roa is a 59 y.o. female and presents with Discuss Labs           Assessment/Plan:  Joseph \"nasra\" was seen today for discuss labs.    Diagnoses and all orders for this visit:    Mixed hyperlipidemia    Anhedonia  -     buPROPion (WELLBUTRIN XL) 150 MG extended release tablet; Take 1 tablet by mouth every morning    Hypokalemia  -     potassium chloride (KLOR-CON M) 10 MEQ extended release tablet; Take 1 tablet by mouth daily  -     Potassium; Future    Class 1 obesity due to excess calories with serious comorbidity and body mass index (BMI) of 30.0 to 30.9 in adult  -     Semaglutide-Weight Management (WEGOVY) 0.25 MG/0.5ML SOAJ SC injection; Inject 0.25 mg into the skin every 7 days    Gastroesophageal reflux disease, unspecified whether esophagitis present  -     omeprazole (PRILOSEC) 40 MG delayed release capsule; Take 1 capsule by mouth daily      HLD: [place] <7.5%; declines to take zetia; educated on continued dietary changes; encouraged to use Nutritionist as needed  Anhedonia has improved with wellbutrin; continue current regimen  Advised to restart potassium supplement and repeat labs in 1 week  Obesity: states Wegovy is covered with her insurance and wants to have this sent; states she will pay out of pocket for rx if not covered; educated on side effects; f/u in 4 weeks      Follow up and disposition:   Return in about 1 week (around 6/9/2025) for labs, review lab results, physical 30 min.      Health Maintenance:   Health Maintenance   Topic Date Due    HIV screen  Never done    Hepatitis C screen  Never done    Colorectal Cancer Screen  Never done    Pneumococcal 50+ years Vaccine (2 of 2 - PCV) 10/24/2016    DTaP/Tdap/Td vaccine (2 - Td or Tdap) 11/12/2024    Annual Wellness Visit (Medicare Advantage)  01/01/2025    Breast cancer screen  05/20/2026    Depression Screen  06/02/2026

## 2025-06-02 NOTE — PROGRESS NOTES
Joseph Roa is a 59 y.o. female (: 1965) presenting to address:    Chief Complaint   Patient presents with    Discuss Labs       Vitals:    25 1445   BP: 129/83   Pulse: 93   Resp: 16   SpO2: 97%       \"Have you been to the ER, urgent care clinic since your last visit?  Hospitalized since your last visit?\"    NO    “Have you seen or consulted any other health care providers outside of [facility] since your last visit?”    Yes, Eye Doctor     “Have you had a colorectal cancer screening such as a colonoscopy/FIT/Cologuard?    NO    No colonoscopy on file  No cologuard on file  No FIT/FOBT on file   No flexible sigmoidoscopy on file

## 2025-06-03 DIAGNOSIS — M79.7 FIBROMYALGIA: ICD-10-CM

## 2025-06-03 RX ORDER — DULOXETIN HYDROCHLORIDE 30 MG/1
90 CAPSULE, DELAYED RELEASE ORAL DAILY
Qty: 90 CAPSULE | Refills: 5 | Status: SHIPPED | OUTPATIENT
Start: 2025-06-03

## 2025-06-12 DIAGNOSIS — M79.7 FIBROMYALGIA: ICD-10-CM

## 2025-06-12 RX ORDER — DULOXETIN HYDROCHLORIDE 30 MG/1
90 CAPSULE, DELAYED RELEASE ORAL DAILY
Qty: 90 CAPSULE | Refills: 5 | OUTPATIENT
Start: 2025-06-12

## 2025-07-09 DIAGNOSIS — I10 ESSENTIAL HYPERTENSION: ICD-10-CM

## 2025-07-10 RX ORDER — HYDROCHLOROTHIAZIDE 25 MG/1
TABLET ORAL
Qty: 90 TABLET | Refills: 1 | OUTPATIENT
Start: 2025-07-10

## 2025-07-16 ENCOUNTER — PATIENT MESSAGE (OUTPATIENT)
Facility: CLINIC | Age: 60
End: 2025-07-16

## 2025-07-16 DIAGNOSIS — I10 ESSENTIAL HYPERTENSION: ICD-10-CM

## 2025-07-17 RX ORDER — HYDROCHLOROTHIAZIDE 25 MG/1
TABLET ORAL
Qty: 30 TABLET | Refills: 0 | Status: SHIPPED | OUTPATIENT
Start: 2025-07-17

## 2025-07-17 NOTE — TELEPHONE ENCOUNTER
Requested Prescriptions     Pending Prescriptions Disp Refills    hydroCHLOROthiazide (HYDRODIURIL) 25 MG tablet 90 tablet 1     Sig: Take one tab po daily     Last seen: 6/2/25  Next seen: 7/25/25    Last filled: 5/8/25 Qty 90 w/1 refill

## 2025-07-24 ENCOUNTER — OFFICE VISIT (OUTPATIENT)
Facility: CLINIC | Age: 60
End: 2025-07-24
Payer: MEDICARE

## 2025-07-24 ENCOUNTER — HOSPITAL ENCOUNTER (OUTPATIENT)
Facility: HOSPITAL | Age: 60
Setting detail: SPECIMEN
Discharge: HOME | End: 2025-07-27
Payer: MEDICARE

## 2025-07-24 VITALS
HEIGHT: 62 IN | DIASTOLIC BLOOD PRESSURE: 72 MMHG | BODY MASS INDEX: 30.47 KG/M2 | WEIGHT: 165.6 LBS | RESPIRATION RATE: 12 BRPM | SYSTOLIC BLOOD PRESSURE: 111 MMHG | HEART RATE: 80 BPM

## 2025-07-24 DIAGNOSIS — R09.89 DECREASED DORSALIS PEDIS PULSE: ICD-10-CM

## 2025-07-24 DIAGNOSIS — E78.2 MIXED HYPERLIPIDEMIA: ICD-10-CM

## 2025-07-24 DIAGNOSIS — E87.6 HYPOKALEMIA: ICD-10-CM

## 2025-07-24 DIAGNOSIS — L67.9 HAIR CHANGES: ICD-10-CM

## 2025-07-24 DIAGNOSIS — E66.09 CLASS 1 OBESITY DUE TO EXCESS CALORIES WITH SERIOUS COMORBIDITY AND BODY MASS INDEX (BMI) OF 30.0 TO 30.9 IN ADULT: ICD-10-CM

## 2025-07-24 DIAGNOSIS — E66.811 CLASS 1 OBESITY DUE TO EXCESS CALORIES WITH SERIOUS COMORBIDITY AND BODY MASS INDEX (BMI) OF 30.0 TO 30.9 IN ADULT: ICD-10-CM

## 2025-07-24 DIAGNOSIS — Z00.00 MEDICARE ANNUAL WELLNESS VISIT, SUBSEQUENT: Primary | ICD-10-CM

## 2025-07-24 DIAGNOSIS — R60.0 PERIPHERAL EDEMA: ICD-10-CM

## 2025-07-24 LAB — POTASSIUM SERPL-SCNC: 4.6 MMOL/L (ref 3.5–5.5)

## 2025-07-24 PROCEDURE — 3074F SYST BP LT 130 MM HG: CPT | Performed by: FAMILY MEDICINE

## 2025-07-24 PROCEDURE — G0439 PPPS, SUBSEQ VISIT: HCPCS | Performed by: FAMILY MEDICINE

## 2025-07-24 PROCEDURE — 84132 ASSAY OF SERUM POTASSIUM: CPT

## 2025-07-24 PROCEDURE — 99214 OFFICE O/P EST MOD 30 MIN: CPT | Performed by: FAMILY MEDICINE

## 2025-07-24 PROCEDURE — 36415 COLL VENOUS BLD VENIPUNCTURE: CPT

## 2025-07-24 PROCEDURE — 3078F DIAST BP <80 MM HG: CPT | Performed by: FAMILY MEDICINE

## 2025-07-24 RX ORDER — FUROSEMIDE 20 MG/1
10 TABLET ORAL DAILY PRN
Qty: 30 TABLET | Refills: 3 | Status: SHIPPED | OUTPATIENT
Start: 2025-07-24

## 2025-07-24 ASSESSMENT — PATIENT HEALTH QUESTIONNAIRE - PHQ9
SUM OF ALL RESPONSES TO PHQ QUESTIONS 1-9: 0
2. FEELING DOWN, DEPRESSED OR HOPELESS: NOT AT ALL
SUM OF ALL RESPONSES TO PHQ QUESTIONS 1-9: 0
1. LITTLE INTEREST OR PLEASURE IN DOING THINGS: NOT AT ALL

## 2025-07-24 ASSESSMENT — LIFESTYLE VARIABLES
HOW OFTEN DO YOU HAVE A DRINK CONTAINING ALCOHOL: MONTHLY OR LESS
HOW MANY STANDARD DRINKS CONTAINING ALCOHOL DO YOU HAVE ON A TYPICAL DAY: 1 OR 2

## 2025-07-24 NOTE — PROGRESS NOTES
[address] [place] [address]               [phone]        Joseph Roa is a 60 y.o. female and presents with Medicare AWV           Assessment/Plan:  Joseph \"nasra\" was seen today for medicare awv.    Diagnoses and all orders for this visit:    Medicare annual wellness visit, subsequent    Hypokalemia    Class 1 obesity due to excess calories with serious comorbidity and body mass index (BMI) of 30.0 to 30.9 in adult  -     Discontinue: tirzepatide-weight management (ZEPBOUND) 2.5 MG/0.5ML SOLN subCUTAneous injection (VIAL); Inject 0.5 mLs into the skin once a week For 4 weeks then transition to 5 mg dose  -     Discontinue: tirzepatide-weight management (ZEPBOUND) 5 MG/0.5ML SOLN subCUTAneous injection (VIAL); Inject 5 mg SC once weekly  -     tirzepatide-weight management (ZEPBOUND) 5 MG/0.5ML SOLN subCUTAneous injection (VIAL); Inject 5 mg SC once weekly  -     tirzepatide-weight management (ZEPBOUND) 2.5 MG/0.5ML SOLN subCUTAneous injection (VIAL); Inject 0.5 mLs into the skin once a week For 4 weeks then transition to 5 mg dose    Mixed hyperlipidemia    Hair changes    Peripheral edema  -     furosemide (LASIX) 20 MG tablet; Take 0.5 tablets by mouth daily as needed (swelling)    Decreased dorsalis pedis pulse  -     Vascular duplex lower extremity arteries bilateral with ZAIRE; Future      Advised to eat healthy diet and begin regular exercise with cardio 2.5 hours/week mixed with strength training;  Obesity: begin zepbound; educated on side effects;   HLD: continue dietary changes  Hair changes: has noted darker hair in her axilla over the last 3-4 months; unsure if anything changed in supplements; advised to have hormone levels tested through her GYN  Normal bps; can d/c HCTZ; recommend prn lasix only if she has swelling  Refer for ZAIRE; had last test 2014          Follow up and disposition:   Return in about 6 weeks (around 9/4/2025), or if symptoms worsen or fail 
past 7 days, did you need help from others to perform any of the following everyday activities: Eating, dressing, grooming, bathing, toileting, or walking/balance? No No    Select all that apply -- --   In the past 7 days, did you need help from others to take care of any of the following: Laundry, housekeeping, banking/finances, shopping, telephone use, food preparation, transportation, or taking medications? No No    Select all that apply -- --   Living Will     Do you have a Living Will? Yes No Abnormal       Health Care Decision Makers    There are no Health Care Decision Makers on file.  Current Code Status    This patient does not have a recorded code status. Follow your organizational policy for patients in this situation.     Advance Directives    Power of  Living Will ACP-Advance Directive ACP-Power of     Not on File  Not on File  Not on File  Not on File     Advance Care Planning Notes       This patient has no ACP notes on file.      Create ACP Note       Medicare Annual Wellness Visit    Joseph Roa is here for Medicare AWV    Assessment & Plan   Hypokalemia  Class 1 obesity due to excess calories with serious comorbidity and body mass index (BMI) of 30.0 to 30.9 in adult  Mixed hyperlipidemia  Medicare annual wellness visit, subsequent       No follow-ups on file.     Subjective       Patient's complete Health Risk Assessment and screening values have been reviewed and are found in Flowsheets. The following problems were reviewed today and where indicated follow up appointments were made and/or referrals ordered.    Positive Risk Factor Screenings with Interventions:    Fall Risk:  Do you feel unsteady or are you worried about falling? : (!) yes  2 or more falls in past year?: (!) yes  Fall with injury in past year?: no  Interventions:    Reviewed medications, home hazards, visual acuity, and co-morbidities that can increase risk for falls  TUG wnl               Abnormal BMI

## 2025-08-07 ENCOUNTER — TELEPHONE (OUTPATIENT)
Facility: CLINIC | Age: 60
End: 2025-08-07

## 2025-08-27 ENCOUNTER — PATIENT MESSAGE (OUTPATIENT)
Facility: CLINIC | Age: 60
End: 2025-08-27

## 2025-08-27 DIAGNOSIS — E66.811 CLASS 1 OBESITY DUE TO EXCESS CALORIES WITH SERIOUS COMORBIDITY AND BODY MASS INDEX (BMI) OF 30.0 TO 30.9 IN ADULT: Primary | ICD-10-CM

## 2025-08-27 DIAGNOSIS — E66.09 CLASS 1 OBESITY DUE TO EXCESS CALORIES WITH SERIOUS COMORBIDITY AND BODY MASS INDEX (BMI) OF 30.0 TO 30.9 IN ADULT: Primary | ICD-10-CM
